# Patient Record
Sex: FEMALE | Race: WHITE | URBAN - METROPOLITAN AREA
[De-identification: names, ages, dates, MRNs, and addresses within clinical notes are randomized per-mention and may not be internally consistent; named-entity substitution may affect disease eponyms.]

---

## 2022-05-11 ENCOUNTER — OFFICE VISIT (OUTPATIENT)
Dept: URBAN - METROPOLITAN AREA CLINIC 76 | Facility: CLINIC | Age: 72
End: 2022-05-11
Payer: MEDICARE

## 2022-05-11 DIAGNOSIS — H25.13 AGE-RELATED NUCLEAR CATARACT, BILATERAL: Primary | ICD-10-CM

## 2022-05-11 DIAGNOSIS — H53.002 AMBLYOPIA OF LEFT EYE: ICD-10-CM

## 2022-05-11 DIAGNOSIS — H52.4 PRESBYOPIA: ICD-10-CM

## 2022-05-11 DIAGNOSIS — H35.54 DYSTROPHIES PRIMARILY INVOLVING THE RETINAL PIGMENT EPITHELIUM: ICD-10-CM

## 2022-05-11 PROCEDURE — 99204 OFFICE O/P NEW MOD 45 MIN: CPT | Performed by: OPTOMETRIST

## 2022-05-11 ASSESSMENT — KERATOMETRY
OS: 42.88
OD: 42.50

## 2022-05-11 ASSESSMENT — INTRAOCULAR PRESSURE
OD: 18
OS: 18

## 2022-05-11 ASSESSMENT — VISUAL ACUITY
OD: 20/20
OS: 20/80

## 2022-05-11 NOTE — IMPRESSION/PLAN
Impression: Presbyopia: H52.4. Bilateral. Plan: Discussed condition. New mrx given today. Pt to call with any concerns. Pt prefers not have any prescription in [place] due to Amblyopia and causing headaches. Defers multifocal lenses.

## 2022-05-11 NOTE — IMPRESSION/PLAN
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 Plan: Discussed diagnosis in detail with patient. Counseling given about the benefits and/or risks of the Age-Related Eye Disease Study (AREDS) formulation for preventing progression of age-related macular degeneration (AMD). Add lutein 20-30 mg, zeaxanthin 2-5mg per day with MV or AREDS 2 MV PO daily as directed. Will continue to observe condition and or symptoms. Call if [address] worsens. Dispensed and explained use of [employer] grid.